# Patient Record
Sex: FEMALE | Race: OTHER | URBAN - METROPOLITAN AREA
[De-identification: names, ages, dates, MRNs, and addresses within clinical notes are randomized per-mention and may not be internally consistent; named-entity substitution may affect disease eponyms.]

---

## 2020-03-10 NOTE — PATIENT DISCUSSION
REVIEWED PVD PRECAUTIONS WITH PATIENT AND ADVISED TO CALL IF EXPERIENCES NEW FLASHES OF LIGHT, INCREASE IN FLOATERS, OR DECREASE VISION IN EITHER EYE. RETURN FOR FOLLOW-UP AS SCHEDULED.

## 2022-07-05 RX ORDER — ESTRADIOL 0.1 MG/G
CREAM VAGINAL
COMMUNITY
Start: 2022-05-09 | End: 2022-11-01

## 2022-07-05 RX ORDER — PANTOPRAZOLE SODIUM 40 MG/1
TABLET, DELAYED RELEASE ORAL
COMMUNITY

## 2022-07-05 RX ORDER — SIMVASTATIN 10 MG
TABLET ORAL
COMMUNITY

## 2022-11-28 ENCOUNTER — NEW PATIENT (OUTPATIENT)
Dept: URBAN - METROPOLITAN AREA CLINIC 14 | Facility: CLINIC | Age: 72
End: 2022-11-28

## 2022-11-28 DIAGNOSIS — H25.13: ICD-10-CM

## 2022-11-28 DIAGNOSIS — H43.813: ICD-10-CM

## 2022-11-28 PROCEDURE — 92136 OPHTHALMIC BIOMETRY: CPT

## 2022-11-28 PROCEDURE — 92015 DETERMINE REFRACTIVE STATE: CPT

## 2022-11-28 PROCEDURE — 99204 OFFICE O/P NEW MOD 45 MIN: CPT

## 2022-11-28 ASSESSMENT — VISUAL ACUITY
OS_CC: 20/40
OS_BCVA: 20/40
OD_CC: 20/40
OD_CC: J1
OS_CC: J2
OD_BCVA: 20/40

## 2022-11-28 ASSESSMENT — KERATOMETRY
OD_AXISANGLE2_DEGREES: 90
OS_AXISANGLE_DEGREES: 153
OD_K1POWER_DIOPTERS: 43.00
OD_AXISANGLE_DEGREES: 0
OD_K2POWER_DIOPTERS: 43.00
OS_K1POWER_DIOPTERS: 43.00
OS_K2POWER_DIOPTERS: 43.50
OS_AXISANGLE2_DEGREES: 63

## 2022-11-28 ASSESSMENT — TONOMETRY
OS_IOP_MMHG: 17
OD_IOP_MMHG: 18